# Patient Record
Sex: MALE | URBAN - METROPOLITAN AREA
[De-identification: names, ages, dates, MRNs, and addresses within clinical notes are randomized per-mention and may not be internally consistent; named-entity substitution may affect disease eponyms.]

---

## 2022-11-10 ENCOUNTER — OFFICE VISIT (OUTPATIENT)
Dept: URBAN - METROPOLITAN AREA CLINIC 48 | Facility: CLINIC | Age: 37
End: 2022-11-10

## 2022-11-10 DIAGNOSIS — H33.051 TOTAL RETINAL DETACHMENT, RIGHT EYE: Primary | ICD-10-CM

## 2022-11-10 PROCEDURE — 99204 OFFICE O/P NEW MOD 45 MIN: CPT | Performed by: OPHTHALMOLOGY

## 2022-11-10 ASSESSMENT — INTRAOCULAR PRESSURE
OS: 15
OD: 2

## 2022-11-10 NOTE — IMPRESSION/PLAN
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 Plan: Macula off, will refer patient to retinal for further evaluation and surgery RTC PRN

## 2022-11-11 ENCOUNTER — OFFICE VISIT (OUTPATIENT)
Dept: URBAN - METROPOLITAN AREA CLINIC 13 | Facility: CLINIC | Age: 37
End: 2022-11-11

## 2022-11-11 DIAGNOSIS — H33.41 PROLIFERATIVE VITREO-RETINOPATHY W/ RETINAL DETACHMENT OF RIGHT EYE: Primary | ICD-10-CM

## 2022-11-11 PROCEDURE — 99204 OFFICE O/P NEW MOD 45 MIN: CPT | Performed by: OPHTHALMOLOGY

## 2022-11-11 ASSESSMENT — INTRAOCULAR PRESSURE: OS: 13

## 2022-11-11 NOTE — IMPRESSION/PLAN
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 Plan: --exam confirms a total RD with PVR-C (star folds) --findings/diagnosis d/w pt in detail
--surgery advised to prevent further vision loss --r/b/a of SB, PPV, and pneumatic retinopexy discussed
--risks discussed, inc bleeding, pain, infection, loss of vision --pt understands that pre-RD VA unlikely to fully return --pt elects to proceed with RD repair SURGICAL PLAN: 25G PPV/SB/MP/EL/SO OD

## 2022-11-16 ENCOUNTER — POST-OPERATIVE VISIT (OUTPATIENT)
Dept: URBAN - METROPOLITAN AREA CLINIC 7 | Facility: CLINIC | Age: 37
End: 2022-11-16

## 2022-11-16 DIAGNOSIS — H33.41 PROLIFERATIVE VITREO-RETINOPATHY W/ RETINAL DETACHMENT OF RIGHT EYE: Primary | ICD-10-CM

## 2022-11-16 PROCEDURE — 99024 POSTOP FOLLOW-UP VISIT: CPT | Performed by: OPHTHALMOLOGY

## 2022-11-16 ASSESSMENT — INTRAOCULAR PRESSURE
OS: 19
OD: 12

## 2022-11-16 NOTE — IMPRESSION/PLAN
Impression: S/P 25G PPV/SB/MP/EL/SO OD - . 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  Plan: --Excellent post op course   
--Post operative instructions reviewed 
--Condition is improving
--No s/s of infection
--IOP normal 

RTC: 1 wk POS OD

## 2022-11-21 ENCOUNTER — POST-OPERATIVE VISIT (OUTPATIENT)
Dept: URBAN - METROPOLITAN AREA CLINIC 47 | Facility: CLINIC | Age: 37
End: 2022-11-21

## 2022-11-21 DIAGNOSIS — H33.051 TOTAL RETINAL DETACHMENT, RIGHT EYE: Primary | ICD-10-CM

## 2022-11-21 PROCEDURE — 99024 POSTOP FOLLOW-UP VISIT: CPT | Performed by: OPHTHALMOLOGY

## 2022-11-21 ASSESSMENT — INTRAOCULAR PRESSURE
OD: 18
OS: 29

## 2022-11-21 NOTE — IMPRESSION/PLAN
Impression: S/P 25G PPV/SB/MP/EL/SO OD - 5 Days. Total retinal detachment, right eye  H33.051. Plan: Avoid supine Chaya. prec. 
FDP or RSD
RTC 1 month POS --Continue Prednisolone acetate 1%--Continue Ofloxacin 0.3%

## 2022-12-06 ENCOUNTER — POST-OPERATIVE VISIT (OUTPATIENT)
Dept: URBAN - METROPOLITAN AREA CLINIC 13 | Facility: CLINIC | Age: 37
End: 2022-12-06

## 2022-12-06 DIAGNOSIS — H33.41 PROLIFERATIVE VITREO-RETINOPATHY W/ RETINAL DETACHMENT OF RIGHT EYE: Primary | ICD-10-CM

## 2022-12-06 PROCEDURE — 92134 CPTRZ OPH DX IMG PST SGM RTA: CPT | Performed by: OPHTHALMOLOGY

## 2022-12-06 PROCEDURE — 99024 POSTOP FOLLOW-UP VISIT: CPT | Performed by: OPHTHALMOLOGY

## 2022-12-06 RX ORDER — BRIMONIDINE TARTRATE 2 MG/ML
0.2 % SOLUTION/ DROPS OPHTHALMIC
Qty: 5 | Refills: 3 | Status: INACTIVE
Start: 2022-12-06 | End: 2023-03-05

## 2022-12-06 ASSESSMENT — INTRAOCULAR PRESSURE
OS: 18
OD: 30

## 2022-12-06 NOTE — IMPRESSION/PLAN
Impression: S/P 25G PPV/SB/MP/EL/SO OD - 20 Days. 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 Plan: --retina attached under oil
--no s/s infection
--IOP elevated, start brim 0.2 BID
--cont taper PF as directed
--OCT shows deep LMH
--RD/endoph WS discussed RTC 8 weeks DFE/OCT OU

## 2023-01-31 ENCOUNTER — OFFICE VISIT (OUTPATIENT)
Dept: URBAN - METROPOLITAN AREA CLINIC 13 | Facility: CLINIC | Age: 38
End: 2023-01-31
Payer: COMMERCIAL

## 2023-01-31 DIAGNOSIS — H33.41 TRACTION DETACHMENT OF RETINA, RIGHT EYE: Primary | ICD-10-CM

## 2023-01-31 PROCEDURE — 99024 POSTOP FOLLOW-UP VISIT: CPT | Performed by: OPHTHALMOLOGY

## 2023-01-31 PROCEDURE — 92134 CPTRZ OPH DX IMG PST SGM RTA: CPT | Performed by: OPHTHALMOLOGY

## 2023-01-31 RX ORDER — BRIMONIDINE TARTRATE 2 MG/ML
0.2 % SOLUTION/ DROPS OPHTHALMIC
Qty: 5 | Refills: 3 | Status: INACTIVE
Start: 2023-01-31 | End: 2023-04-30

## 2023-01-31 ASSESSMENT — INTRAOCULAR PRESSURE
OS: 20
OD: 14

## 2023-01-31 NOTE — IMPRESSION/PLAN
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 S/P 25G PPV/SB/MP/EL/SO OD 11/16/22 Plan: --retina remains attached under oil
--IOP improved, cont brim 0.2 BID
--OCT shows a deep lamellar MH and scarring
--surgical removal of the oil is advised at this time
--R/B/A discussed inc bleeding, pain, infection, recurrent RD
--pt agrees to proceed with surgery SURGICAL PLAN: 25G PPV/SO REMOVAL (1000)/AFX OD

## 2023-02-15 ENCOUNTER — POST-OPERATIVE VISIT (OUTPATIENT)
Dept: URBAN - METROPOLITAN AREA CLINIC 7 | Facility: CLINIC | Age: 38
End: 2023-02-15
Payer: COMMERCIAL

## 2023-02-15 DIAGNOSIS — H33.051 TOTAL RETINAL DETACHMENT, RIGHT EYE: Primary | ICD-10-CM

## 2023-02-15 PROCEDURE — 99024 POSTOP FOLLOW-UP VISIT: CPT | Performed by: OPHTHALMOLOGY

## 2023-02-15 ASSESSMENT — INTRAOCULAR PRESSURE
OD: 15
OS: 20

## 2023-02-15 NOTE — IMPRESSION/PLAN
Impression: S/P 25G PPV/SO REMOVAL (1000)/AFX OD OD - 1 Day. Total retinal detachment, right eye  H33.051. Plan: PF/Oflox QID OD. Gas precautions Sleep on either side RTC 1 week POS DFE OD

## 2023-02-20 ENCOUNTER — POST-OPERATIVE VISIT (OUTPATIENT)
Dept: URBAN - METROPOLITAN AREA CLINIC 47 | Facility: CLINIC | Age: 38
End: 2023-02-20
Payer: COMMERCIAL

## 2023-02-20 DIAGNOSIS — H33.051 TOTAL RETINAL DETACHMENT, RIGHT EYE: Primary | ICD-10-CM

## 2023-02-20 PROCEDURE — 99024 POSTOP FOLLOW-UP VISIT: CPT | Performed by: OPHTHALMOLOGY

## 2023-02-20 ASSESSMENT — INTRAOCULAR PRESSURE
OS: 19
OD: 12

## 2023-02-20 NOTE — IMPRESSION/PLAN
Impression: S/P 25G PPV/SO REMOVAL (1000)/AFX OD OD - 6 Days. Total retinal detachment, right eye  H33.051. Plan: Avoid Supine 
taper off PF Alt. Prec.  
RTC  4-6 weeks OCT OU; POS --Taper Prednisolone acetate 1% TID x 1 wk, BID x 1wk, QD x 1wk, then d/c--Discontinue Ofloxacin 0.3%

## 2023-04-03 ENCOUNTER — POST-OPERATIVE VISIT (OUTPATIENT)
Dept: URBAN - METROPOLITAN AREA CLINIC 47 | Facility: CLINIC | Age: 38
End: 2023-04-03
Payer: COMMERCIAL

## 2023-04-03 DIAGNOSIS — H33.051 TOTAL RETINAL DETACHMENT, RIGHT EYE: Primary | ICD-10-CM

## 2023-04-03 PROCEDURE — 92134 CPTRZ OPH DX IMG PST SGM RTA: CPT | Performed by: OPHTHALMOLOGY

## 2023-04-03 PROCEDURE — 76512 OPH US DX B-SCAN: CPT | Performed by: OPHTHALMOLOGY

## 2023-04-03 PROCEDURE — 99024 POSTOP FOLLOW-UP VISIT: CPT | Performed by: OPHTHALMOLOGY

## 2023-04-03 ASSESSMENT — INTRAOCULAR PRESSURE
OS: 17
OD: 13

## 2023-04-03 NOTE — IMPRESSION/PLAN
Impression: S/P 25G PPV/SO REMOVAL (1000)/AFX OD OD - 48 Days. Total retinal detachment, right eye  H33.051. Plan: doing well with attached retina but poor view due to his cataract OCT shows no CME/SRF OU B-scan OD shows no RD. observe RTC 2 months OCT OU

## 2023-05-19 ENCOUNTER — OFFICE VISIT (OUTPATIENT)
Dept: URBAN - METROPOLITAN AREA CLINIC 7 | Facility: CLINIC | Age: 38
End: 2023-05-19
Payer: COMMERCIAL

## 2023-05-19 DIAGNOSIS — H33.051 TOTAL RETINAL DETACHMENT, RIGHT EYE: Primary | ICD-10-CM

## 2023-05-19 DIAGNOSIS — H25.811 COMBINED FORMS OF AGE-RELATED CATARACT, RIGHT EYE: ICD-10-CM

## 2023-05-19 PROCEDURE — 92134 CPTRZ OPH DX IMG PST SGM RTA: CPT | Performed by: OPHTHALMOLOGY

## 2023-05-19 PROCEDURE — 76512 OPH US DX B-SCAN: CPT | Performed by: OPHTHALMOLOGY

## 2023-05-19 PROCEDURE — 99213 OFFICE O/P EST LOW 20 MIN: CPT | Performed by: OPHTHALMOLOGY

## 2023-05-19 ASSESSMENT — INTRAOCULAR PRESSURE
OS: 17
OD: 18

## 2023-05-19 NOTE — IMPRESSION/PLAN
Impression: Total retinal detachment, right eye  H33.051. S/P 25G PPV/SO REMOVAL (1000)/AFX - 02/06/23
s/p 25g  PPV/SB/MP/EL/SO - 11/16/22 Plan: Retina remains fully attached on b-scan. No view on OCT due to cataract. Refer for CE/IOL OD.  

RTC PRN

## 2023-05-19 NOTE — IMPRESSION/PLAN
Impression: Combined forms of age-related cataract, right eye: H25.811. Plan: Ready for CE/IOL. Will refer to cataract surgeon.

## 2023-06-20 ENCOUNTER — OFFICE VISIT (OUTPATIENT)
Dept: URBAN - METROPOLITAN AREA CLINIC 48 | Facility: CLINIC | Age: 38
End: 2023-06-20
Payer: COMMERCIAL

## 2023-06-20 DIAGNOSIS — H25.811 COMBINED FORMS OF AGE-RELATED CATARACT, RIGHT EYE: Primary | ICD-10-CM

## 2023-06-20 PROCEDURE — 92014 COMPRE OPH EXAM EST PT 1/>: CPT | Performed by: STUDENT IN AN ORGANIZED HEALTH CARE EDUCATION/TRAINING PROGRAM

## 2023-06-20 ASSESSMENT — INTRAOCULAR PRESSURE
OS: 15
OD: 22

## 2023-06-20 ASSESSMENT — VISUAL ACUITY
OD: LP
OS: 20/40

## 2023-06-20 ASSESSMENT — KERATOMETRY
OS: 44.00
OD: 43.63

## 2023-06-20 NOTE — IMPRESSION/PLAN
Impression: Combined forms of age-related cataract, right eye: H25.811. Plan: The patient has a visually significant cataract in the right eye. After discussion with the patient and careful examination it has been determined that a cataract in the right eye is accounting for a significant amount of patient's visual symptoms. Cataract surgery and the associated risks, benefits, alternatives, expectations, and recovery were discussed in detail with the patient. All questions were answered. The patient understands that there may be some limitation in visual potential given any pre-existing ocular disease. Advised patient we will be using: ERX 
COMPLEX SX SCHEDULED X 2 SLOTS *** plan for Malyugin ring, Phenylephrine, Trypan, Synequiolysis, high risk of zonular injury or posterior sub capsule injury. Schedule Cataract Surgery OD. RL 2
poor 6.5 mm  dilation, no previous LASIK surgery, no alpha blockers Discussed lens options with patient, patient candidate for standard lens only, given previous vitrectomy x 2. Limited VA given retina tear, high risk procedure reviewed with patient.

## 2023-07-10 ENCOUNTER — TESTING ONLY (OUTPATIENT)
Dept: URBAN - METROPOLITAN AREA CLINIC 48 | Facility: CLINIC | Age: 38
End: 2023-07-10
Payer: COMMERCIAL

## 2023-07-10 DIAGNOSIS — H25.811 COMBINED FORMS OF AGE-RELATED CATARACT, RIGHT EYE: Primary | ICD-10-CM

## 2023-07-10 RX ORDER — KETOROLAC TROMETHAMINE 5 MG/ML
0.5 % SOLUTION OPHTHALMIC
Qty: 5 | Refills: 1 | Status: ACTIVE
Start: 2023-07-10

## 2023-07-10 RX ORDER — PREDNISOLONE ACETATE 10 MG/ML
1 % SUSPENSION/ DROPS OPHTHALMIC
Qty: 5 | Refills: 1 | Status: ACTIVE
Start: 2023-07-10

## 2023-07-10 RX ORDER — MOXIFLOXACIN 5 MG/ML
0.5 % SOLUTION/ DROPS OPHTHALMIC
Qty: 5 | Refills: 1 | Status: ACTIVE
Start: 2023-07-10

## 2023-07-10 ASSESSMENT — KERATOMETRY
OD: 43.91
OS: 44.02

## 2023-07-10 ASSESSMENT — PACHYMETRY
OD: 2.61
OS: 25.38
OS: 3.64
OD: 28.47

## 2023-07-13 ENCOUNTER — PROCEDURE (OUTPATIENT)
Dept: URBAN - METROPOLITAN AREA SURGERY 24 | Facility: SURGERY | Age: 38
End: 2023-07-13
Payer: COMMERCIAL

## 2023-07-13 ENCOUNTER — Encounter (OUTPATIENT)
Dept: URBAN - METROPOLITAN AREA SURGERY 25 | Facility: SURGERY | Age: 38
End: 2023-07-13
Payer: COMMERCIAL

## 2023-07-13 DIAGNOSIS — H21.81 FLOPPY IRIS SYNDROME: Primary | ICD-10-CM

## 2023-07-13 PROCEDURE — 66982 XCAPSL CTRC RMVL CPLX WO ECP: CPT | Performed by: STUDENT IN AN ORGANIZED HEALTH CARE EDUCATION/TRAINING PROGRAM

## 2023-07-14 ENCOUNTER — POST-OPERATIVE VISIT (OUTPATIENT)
Dept: URBAN - METROPOLITAN AREA CLINIC 48 | Facility: CLINIC | Age: 38
End: 2023-07-14
Payer: COMMERCIAL

## 2023-07-14 DIAGNOSIS — Z48.810 ENCOUNTER FOR SURGICAL AFTERCARE FOLLOWING SURGERY ON A SENSE ORGAN: Primary | ICD-10-CM

## 2023-07-14 PROCEDURE — 99024 POSTOP FOLLOW-UP VISIT: CPT | Performed by: STUDENT IN AN ORGANIZED HEALTH CARE EDUCATION/TRAINING PROGRAM

## 2023-07-14 ASSESSMENT — INTRAOCULAR PRESSURE: OD: 22

## 2023-07-14 NOTE — IMPRESSION/PLAN
Impression: S/P [id number] Cataract Extraction by phacoemulsification with IOL placement OD - 1 Day. Encounter for surgical aftercare following surgery on a sense organ  Z48.810. Plan: - Eye is doing well
- Start Ofloxacin QID to affected eye
- Start Ketorolac QID to affected eye
- Start Prednisolone acetate QID to affected eye - Reviewed post op precautions with the patient including no bending past the waist, no heavy lifting, keeping the eye clean and dry, and  drops by at least 3 minutes
- RT/RD and endophthalmitis  precautions reviewed with the patient with strict RTC instructions

## 2023-07-20 ENCOUNTER — POST-OPERATIVE VISIT (OUTPATIENT)
Dept: URBAN - METROPOLITAN AREA CLINIC 48 | Facility: CLINIC | Age: 38
End: 2023-07-20
Payer: COMMERCIAL

## 2023-07-20 DIAGNOSIS — Z48.810 ENCOUNTER FOR SURGICAL AFTERCARE FOLLOWING SURGERY ON A SENSE ORGAN: Primary | ICD-10-CM

## 2023-07-20 PROCEDURE — 99024 POSTOP FOLLOW-UP VISIT: CPT | Performed by: STUDENT IN AN ORGANIZED HEALTH CARE EDUCATION/TRAINING PROGRAM

## 2023-07-20 ASSESSMENT — INTRAOCULAR PRESSURE: OD: 14

## 2023-07-20 NOTE — IMPRESSION/PLAN
Impression: S/P 59485 Cataract Extraction by phacoemulsification with IOL placement OD - 7 Days. Encounter for surgical aftercare following surgery on a sense organ  Z48.810. Plan: - Eye is doing well
- d/c ofloxacin and ketorolac - Begin taper of prednisolone acetate to QID
- RT/RD precautions reviewed with the patient
- Can resume normal activities with the exception of avoiding swimming for 1 more week

- RTC 2 week post -op and OCT MAc

## 2023-08-03 ENCOUNTER — POST-OPERATIVE VISIT (OUTPATIENT)
Dept: URBAN - METROPOLITAN AREA CLINIC 48 | Facility: CLINIC | Age: 38
End: 2023-08-03
Payer: COMMERCIAL

## 2023-08-03 DIAGNOSIS — Z48.810 ENCOUNTER FOR SURGICAL AFTERCARE FOLLOWING SURGERY ON A SENSE ORGAN: Primary | ICD-10-CM

## 2023-08-03 PROCEDURE — 99024 POSTOP FOLLOW-UP VISIT: CPT | Performed by: STUDENT IN AN ORGANIZED HEALTH CARE EDUCATION/TRAINING PROGRAM

## 2023-08-03 RX ORDER — PREDNISOLONE ACETATE 10 MG/ML
1 % SUSPENSION/ DROPS OPHTHALMIC
Qty: 10 | Refills: 1 | Status: ACTIVE
Start: 2023-08-03

## 2023-08-03 ASSESSMENT — INTRAOCULAR PRESSURE
OD: 10
OS: 15

## 2023-08-10 ENCOUNTER — POST-OPERATIVE VISIT (OUTPATIENT)
Dept: URBAN - METROPOLITAN AREA CLINIC 48 | Facility: CLINIC | Age: 38
End: 2023-08-10
Payer: COMMERCIAL

## 2023-08-10 DIAGNOSIS — Z48.810 ENCOUNTER FOR SURGICAL AFTERCARE FOLLOWING SURGERY ON A SENSE ORGAN: Primary | ICD-10-CM

## 2023-08-10 PROCEDURE — 99024 POSTOP FOLLOW-UP VISIT: CPT | Performed by: STUDENT IN AN ORGANIZED HEALTH CARE EDUCATION/TRAINING PROGRAM

## 2023-08-10 ASSESSMENT — INTRAOCULAR PRESSURE: OD: 16

## 2023-08-17 ENCOUNTER — POST-OPERATIVE VISIT (OUTPATIENT)
Dept: URBAN - METROPOLITAN AREA CLINIC 48 | Facility: CLINIC | Age: 38
End: 2023-08-17
Payer: COMMERCIAL

## 2023-08-17 DIAGNOSIS — Z48.810 ENCOUNTER FOR SURGICAL AFTERCARE FOLLOWING SURGERY ON A SENSE ORGAN: Primary | ICD-10-CM

## 2023-08-17 PROCEDURE — 99024 POSTOP FOLLOW-UP VISIT: CPT | Performed by: STUDENT IN AN ORGANIZED HEALTH CARE EDUCATION/TRAINING PROGRAM

## 2023-08-17 ASSESSMENT — INTRAOCULAR PRESSURE
OD: 18
OS: 12

## 2023-08-23 ENCOUNTER — OFFICE VISIT (OUTPATIENT)
Facility: LOCATION | Age: 38
End: 2023-08-23
Payer: COMMERCIAL

## 2023-08-23 DIAGNOSIS — H35.351 CYSTOID MACULAR DEGENERATION, RIGHT EYE: ICD-10-CM

## 2023-08-23 DIAGNOSIS — H33.051 TOTAL RETINAL DETACHMENT, RIGHT EYE: Primary | ICD-10-CM

## 2023-08-23 PROCEDURE — 92014 COMPRE OPH EXAM EST PT 1/>: CPT | Performed by: OPHTHALMOLOGY

## 2023-08-23 PROCEDURE — 92134 CPTRZ OPH DX IMG PST SGM RTA: CPT | Performed by: OPHTHALMOLOGY

## 2023-08-23 RX ORDER — KETOROLAC TROMETHAMINE 5 MG/ML
0.5 % SOLUTION/ DROPS OPHTHALMIC
Qty: 5 | Refills: 2 | Status: ACTIVE
Start: 2023-08-23

## 2023-08-23 ASSESSMENT — INTRAOCULAR PRESSURE
OS: 16
OD: 12

## 2023-08-31 ENCOUNTER — POST-OPERATIVE VISIT (OUTPATIENT)
Dept: URBAN - METROPOLITAN AREA CLINIC 48 | Facility: CLINIC | Age: 38
End: 2023-08-31
Payer: COMMERCIAL

## 2023-08-31 DIAGNOSIS — Z48.810 ENCOUNTER FOR SURGICAL AFTERCARE FOLLOWING SURGERY ON A SENSE ORGAN: Primary | ICD-10-CM

## 2023-08-31 PROCEDURE — 99024 POSTOP FOLLOW-UP VISIT: CPT | Performed by: STUDENT IN AN ORGANIZED HEALTH CARE EDUCATION/TRAINING PROGRAM

## 2023-08-31 ASSESSMENT — INTRAOCULAR PRESSURE: OD: 19

## 2023-10-02 ENCOUNTER — OFFICE VISIT (OUTPATIENT)
Facility: LOCATION | Age: 38
End: 2023-10-02
Payer: COMMERCIAL

## 2023-10-02 DIAGNOSIS — H35.351 CYSTOID MACULAR DEGENERATION, RIGHT EYE: ICD-10-CM

## 2023-10-02 DIAGNOSIS — H33.051 TOTAL RETINAL DETACHMENT, RIGHT EYE: Primary | ICD-10-CM

## 2023-10-02 DIAGNOSIS — H26.491 OTHER SECONDARY CATARACT, RIGHT EYE: ICD-10-CM

## 2023-10-02 PROCEDURE — 92014 COMPRE OPH EXAM EST PT 1/>: CPT | Performed by: OPHTHALMOLOGY

## 2023-10-02 PROCEDURE — 92134 CPTRZ OPH DX IMG PST SGM RTA: CPT | Performed by: OPHTHALMOLOGY

## 2023-10-02 ASSESSMENT — INTRAOCULAR PRESSURE
OD: 19
OS: 21

## 2023-12-01 ENCOUNTER — OFFICE VISIT (OUTPATIENT)
Dept: URBAN - METROPOLITAN AREA CLINIC 48 | Facility: CLINIC | Age: 38
End: 2023-12-01
Payer: COMMERCIAL

## 2023-12-01 PROCEDURE — 92014 COMPRE OPH EXAM EST PT 1/>: CPT | Performed by: STUDENT IN AN ORGANIZED HEALTH CARE EDUCATION/TRAINING PROGRAM

## 2023-12-01 ASSESSMENT — INTRAOCULAR PRESSURE
OD: 19
OS: 15

## 2023-12-13 ENCOUNTER — OFFICE VISIT (OUTPATIENT)
Facility: LOCATION | Age: 38
End: 2023-12-13
Payer: COMMERCIAL

## 2023-12-13 DIAGNOSIS — H33.051 TOTAL RETINAL DETACHMENT, RIGHT EYE: Primary | ICD-10-CM

## 2023-12-13 DIAGNOSIS — H26.491 OTHER SECONDARY CATARACT, RIGHT EYE: ICD-10-CM

## 2023-12-13 DIAGNOSIS — H35.351 CYSTOID MACULAR DEGENERATION, RIGHT EYE: ICD-10-CM

## 2023-12-13 PROCEDURE — 92012 INTRM OPH EXAM EST PATIENT: CPT | Performed by: OPHTHALMOLOGY

## 2023-12-13 PROCEDURE — 92134 CPTRZ OPH DX IMG PST SGM RTA: CPT | Performed by: OPHTHALMOLOGY

## 2023-12-13 ASSESSMENT — INTRAOCULAR PRESSURE
OD: 9
OS: 10

## 2024-01-11 ENCOUNTER — LASER (OUTPATIENT)
Dept: URBAN - METROPOLITAN AREA SURGERY 24 | Facility: SURGERY | Age: 39
End: 2024-01-11
Payer: COMMERCIAL

## 2024-01-11 ENCOUNTER — Encounter (OUTPATIENT)
Dept: URBAN - METROPOLITAN AREA SURGERY 25 | Facility: SURGERY | Age: 39
End: 2024-01-11
Payer: COMMERCIAL

## 2024-01-11 PROCEDURE — 66821 AFTER CATARACT LASER SURGERY: CPT | Performed by: STUDENT IN AN ORGANIZED HEALTH CARE EDUCATION/TRAINING PROGRAM

## 2024-01-12 ENCOUNTER — POST-OPERATIVE VISIT (OUTPATIENT)
Dept: URBAN - METROPOLITAN AREA CLINIC 48 | Facility: CLINIC | Age: 39
End: 2024-01-12
Payer: COMMERCIAL

## 2024-01-12 PROCEDURE — 99024 POSTOP FOLLOW-UP VISIT: CPT | Performed by: STUDENT IN AN ORGANIZED HEALTH CARE EDUCATION/TRAINING PROGRAM

## 2024-01-12 RX ORDER — PREDNISOLONE ACETATE 10 MG/ML
1 % SUSPENSION/ DROPS OPHTHALMIC
Qty: 5 | Refills: 0 | Status: ACTIVE
Start: 2024-01-12

## 2024-01-12 ASSESSMENT — INTRAOCULAR PRESSURE: OD: 11

## 2024-02-01 ENCOUNTER — POST-OPERATIVE VISIT (OUTPATIENT)
Dept: URBAN - METROPOLITAN AREA CLINIC 48 | Facility: CLINIC | Age: 39
End: 2024-02-01
Payer: COMMERCIAL

## 2024-02-01 DIAGNOSIS — Z48.810 ENCOUNTER FOR SURGICAL AFTERCARE FOLLOWING SURGERY ON A SENSE ORGAN: Primary | ICD-10-CM

## 2024-02-01 PROCEDURE — 99024 POSTOP FOLLOW-UP VISIT: CPT | Performed by: STUDENT IN AN ORGANIZED HEALTH CARE EDUCATION/TRAINING PROGRAM

## 2024-02-01 ASSESSMENT — INTRAOCULAR PRESSURE: OD: 14

## 2024-03-21 ENCOUNTER — Encounter (OUTPATIENT)
Dept: URBAN - METROPOLITAN AREA SURGERY 25 | Facility: SURGERY | Age: 39
End: 2024-03-21
Payer: COMMERCIAL

## 2024-03-21 ENCOUNTER — LASER (OUTPATIENT)
Dept: URBAN - METROPOLITAN AREA SURGERY 24 | Facility: SURGERY | Age: 39
End: 2024-03-21
Payer: COMMERCIAL

## 2024-03-21 PROCEDURE — 66821 AFTER CATARACT LASER SURGERY: CPT | Performed by: STUDENT IN AN ORGANIZED HEALTH CARE EDUCATION/TRAINING PROGRAM

## 2024-03-22 ENCOUNTER — POST-OPERATIVE VISIT (OUTPATIENT)
Dept: URBAN - METROPOLITAN AREA CLINIC 48 | Facility: CLINIC | Age: 39
End: 2024-03-22
Payer: COMMERCIAL

## 2024-03-22 DIAGNOSIS — Z48.810 ENCOUNTER FOR SURGICAL AFTERCARE FOLLOWING SURGERY ON A SENSE ORGAN: Primary | ICD-10-CM

## 2024-03-22 PROCEDURE — 99024 POSTOP FOLLOW-UP VISIT: CPT | Performed by: STUDENT IN AN ORGANIZED HEALTH CARE EDUCATION/TRAINING PROGRAM

## 2024-03-22 ASSESSMENT — INTRAOCULAR PRESSURE: OD: 14

## 2024-03-28 ENCOUNTER — POST-OPERATIVE VISIT (OUTPATIENT)
Dept: URBAN - METROPOLITAN AREA CLINIC 48 | Facility: CLINIC | Age: 39
End: 2024-03-28
Payer: COMMERCIAL

## 2024-03-28 DIAGNOSIS — Z48.810 ENCOUNTER FOR SURGICAL AFTERCARE FOLLOWING SURGERY ON A SENSE ORGAN: Primary | ICD-10-CM

## 2024-03-28 PROCEDURE — 99024 POSTOP FOLLOW-UP VISIT: CPT | Performed by: OPHTHALMOLOGY

## 2024-03-28 ASSESSMENT — INTRAOCULAR PRESSURE: OD: 14

## 2024-05-17 ENCOUNTER — POST-OPERATIVE VISIT (OUTPATIENT)
Dept: URBAN - METROPOLITAN AREA CLINIC 48 | Facility: CLINIC | Age: 39
End: 2024-05-17
Payer: COMMERCIAL

## 2024-05-17 DIAGNOSIS — Z48.810 ENCOUNTER FOR SURGICAL AFTERCARE FOLLOWING SURGERY ON A SENSE ORGAN: Primary | ICD-10-CM

## 2024-05-17 PROCEDURE — 99024 POSTOP FOLLOW-UP VISIT: CPT | Performed by: STUDENT IN AN ORGANIZED HEALTH CARE EDUCATION/TRAINING PROGRAM

## 2024-05-17 ASSESSMENT — INTRAOCULAR PRESSURE
OS: 13
OD: 21

## 2024-08-30 ENCOUNTER — OFFICE VISIT (OUTPATIENT)
Dept: URBAN - METROPOLITAN AREA CLINIC 48 | Facility: CLINIC | Age: 39
End: 2024-08-30
Payer: COMMERCIAL

## 2024-08-30 DIAGNOSIS — H33.051 TOTAL RETINAL DETACHMENT, RIGHT EYE: Primary | ICD-10-CM

## 2024-08-30 DIAGNOSIS — H35.351 CYSTOID MACULAR DEGENERATION, RIGHT EYE: ICD-10-CM

## 2024-08-30 PROCEDURE — 99214 OFFICE O/P EST MOD 30 MIN: CPT | Performed by: OPHTHALMOLOGY

## 2024-08-30 PROCEDURE — 92134 CPTRZ OPH DX IMG PST SGM RTA: CPT | Performed by: OPHTHALMOLOGY

## 2024-08-30 RX ORDER — KETOROLAC TROMETHAMINE 5 MG/ML
0.5 % SOLUTION/ DROPS OPHTHALMIC
Qty: 5 | Refills: 2 | Status: ACTIVE
Start: 2024-08-30

## 2024-08-30 ASSESSMENT — INTRAOCULAR PRESSURE
OD: 13
OS: 13